# Patient Record
Sex: MALE | Race: WHITE
[De-identification: names, ages, dates, MRNs, and addresses within clinical notes are randomized per-mention and may not be internally consistent; named-entity substitution may affect disease eponyms.]

---

## 2017-11-21 ENCOUNTER — HOSPITAL ENCOUNTER (OUTPATIENT)
Dept: HOSPITAL 53 - M SLEEP HO | Age: 27
End: 2017-11-21
Attending: PHYSICIAN ASSISTANT
Payer: COMMERCIAL

## 2017-11-21 DIAGNOSIS — R06.83: Primary | ICD-10-CM

## 2017-11-28 NOTE — SLEEPHOME
DATE OF PROCEDURE:  11/21/2017

 

REFERRING PHYSICIAN: Satinder Laurent

 

Diagnostic home sleep testing was performed due to concern for the obstructive

sleep apnea syndrome.

 

For testing a NOX-T3 respiratory monitoring device was used. Continuous record

was made of pulse, oxygen saturation, airflow, chest and abdominal strain and

body position.

 

10 hours and 6 minutes of data were reviewed. There were 7 hours and 25 minutes

marked as time in bed. During the interval marked time in bed there were 53

respiratory events were identified of 10 seconds in duration or greater for a

respiratory event index of 7.1. The events were primary obstructive. Baseline

pulse rate was 75. Pulse rate range 60 to 119. Baseline oxygen saturation was

94%. Lowest oxygen saturation was 84%. Testing was performed in both the supine

and non-supine positions.

 

IMPRESSION:

Abnormal home sleep testing with repetitive respiratory events and oxygen

desaturations to 84% with a respiratory event index of 7 is consistent with the

obstructive sleep apnea syndrome.

 

RECOMMENDATIONS: The patient should be referred for formal sleep evaluation and

in laboratory pressure titration.

## 2018-02-25 ENCOUNTER — HOSPITAL ENCOUNTER (OUTPATIENT)
Dept: HOSPITAL 53 - M SLEEP | Age: 28
End: 2018-02-25
Attending: NURSE PRACTITIONER
Payer: COMMERCIAL

## 2018-02-25 DIAGNOSIS — G47.33: Primary | ICD-10-CM

## 2018-02-25 PROCEDURE — 95811 POLYSOM 6/>YRS CPAP 4/> PARM: CPT
